# Patient Record
Sex: MALE | Race: WHITE
[De-identification: names, ages, dates, MRNs, and addresses within clinical notes are randomized per-mention and may not be internally consistent; named-entity substitution may affect disease eponyms.]

---

## 2018-04-08 NOTE — EMERGENCY ROOM VISIT NOTE
History


Report prepared by Adela:  Desiree Mcfadden


Under the Supervision of:  Dr. Aiden Berry M.D.


First contact with patient:  09:17


Chief Complaint:  ARM PAIN


Stated Complaint:  RIGHT ARM PAIN





History of Present Illness


The patient is a 17 year old male who presents to the Emergency Room with 

complaints of persistent right arm pain starting last night. He fell and landed 

on both arms last night. He reports pain mainly in his right elbow. He rates 

his discomfort as a 6-7/10 at worst. He denies any other injury. He can move 

his elbow, but cannot straighten his arm because of the pain. He has never 

injured his right arm before. He is right handed. He took an [employer] to the 

hospital. Pt denies LOC, headache, visual changes, neck pain, chest pain, 

breathing difficulties, nausea, vomiting, abdominal pain, back pain, numbness, 

weakness, open wounds, active bleeding, or other complaints.





   Source of History:  patient


   Onset:  last night


   Position:  arm (right)


   Symptom Intensity:  6-7/10


   Quality:  other (persistent)


   Timing:  other (injury, pain)


   Modifying Factors (Worsening):  movement


Note:


Pt denies any other injury.





Review of Systems


See HPI for pertinent positives and negatives.  A total of 6 systems were 

reviewed and were otherwise negative.





Past Medical & Surgical


Medical Problems:


(1) No chronic problems








Family History


No pertinent family history stated.





Social History


Smoking Status:  Current Every Day Smoker


Housing Status:  lives with roommate


Occupation Status:  [employer] student





Current/Historical Medications


No Active Prescriptions or Reported Meds





Allergies


Coded Allergies:  


     No Known Allergies (Unverified , 4/8/18)





Physical Exam


Vital Signs











  Date Time  Temp Pulse Resp B/P (MAP) Pulse Ox O2 Delivery O2 Flow Rate FiO2


 


4/8/18 10:44  75 16  97   


 


4/8/18 09:01 36.7 84 16 126/85 97 Room Air  











Physical Exam


GENERAL: Awake, alert, well-appearing, in no distress


HENT: Normocephalic, atraumatic. Oropharynx unremarkable.


EYES: Normal conjunctiva. Sclera non-icteric.


NECK: Supple. No nuchal rigidity. FROM. No masses.


RESPIRATORY: Clear to auscultation. No wheezes. No rales.  Normal respiratory 

effort.


CARDIAC: Normal rate.  Normal rhythm. No murmurs.  No rubs. Extremities warm 

and well perfused. Pulses equal.  No JVD.


GI: Soft, non-distended. No tenderness to palpation. No rebound or guarding. No 

masses.


RECTAL: Deferred.


MUSCULOSKELETAL: Tenderness over the radial head of the right arm, swelling 

over the olecranon. Medial and lateral epicondyle nontender. RUE is NVI over 

all dermatomes and myotomes. Shoulder is atraumatic and nontender. Distal 

forearm, wrist, and hand are atraumatic and nontender. Chest examination 

reveals no tenderness. The back is symmetrical on inspection without obvious 

abnormality. There is no CVA tenderness to palpation. No joint edema. 


LOWER EXTREMITIES: Calves are equal size bilaterally and non-tender. No edema. 

No discoloration. 


NEURO: Normal sensorium. No sensory or motor deficits noted. 


SKIN: No rash or jaundice noted.





Medical Decision & Procedures


ER Provider


Diagnostic Interpretation:


Radiology results as stated below per my review and radiologist interpretation: 





R ELBOW MIN 3 VIEWS ROUTINE





CLINICAL HISTORY: Elbow pain status post trauma     





COMPARISON: None.





DISCUSSION: There is a hemarthrosis. There is a radial head fracture. There is


no dislocation.    





IMPRESSION: Acute radial head fracture. Hemarthrosis.











Electronically signed by:  Sarmad Hernandez M.D.


4/8/2018 9:56 AM





Dictated Date/Time:  4/8/2018 9:55 AM





ED Course


0938: The patient was evaluated in room C12B. A complete history and physical 

exam was performed.





1024: I reevaluated the patient. Discussed results and discharge instructions: 

He verbalized understanding and agreement. The patient is ready for discharge.





Medical Decision


Triage Nursing notes reviewed and agree them.


The patient's history was concerning for traumatic injury.  





Differential diagnosis:


Etiologies such as fracture, dislocation, neurovascular compromise, compartment 

syndrome, soft tissue injury,  as well as others were entertained.  





Physical examination:


Consistent with an isolated right elbow injury.





ER treatment provided:


Patient declined analgesia


Ice


Sling


On reassessment the patient felt better.





Diagnostics interpreted by me:





Imaging studies:


Xrays as above.





The patient has isolated radial head fracture.  He is neurovascularly intact 

over all dermatomes and myotomes.  No open wounds.  He will be placed in a 

sling.  He was offered prescription pain medication but declined.  He will use 

ibuprofen and Tylenol.  He will follow-up at [facility] tomorrow.  

I gave my usual and customary discussion regarding this issue.


By the evaluation outlined above emergent etiologies such as open fracture, 

dislocation, neurovascular compromise, compartment syndrome, infections, as 

well as others were deemed relatively unlikely.  


The patient was informed about the findings as listed above.  All questions 

were answered and he was pleased with the treatment.  Return instructions were 

outlined and the patient was discharged in stable condition.





Prescription management:


Declined





Referral: 


The patient was referred to [facility] orth[facility] for follow-up care.





Medication Reconcilliation


Current Medication List:  was personally reviewed by me





Blood Pressure Screening


Patient's blood pressure:  Elevated blood pressure


Blood pressure disposition:  Elevated BP felt to be situational





Impression





 Primary Impression:  


 Radial head fracture





Scribe Attestation


The scribe's documentation has been prepared under my direction and personally 

reviewed by me in its entirety. I confirm that the note above accurately 

reflects all work, treatment, procedures, and medical decision making performed 

by me.





Departure Information


Dispostion


Home / Self-Care





Prescriptions





No Active Prescriptions or Reported Meds





Referrals


Shankar Malik MD





Forms


HOME CARE DOCUMENTATION FORM,                                                 

               IMPORTANT VISIT INFORMATION





Patient Instructions


My [facility]





Additional Instructions





ORTHOPEDIC INSTRUCTIONS:





Ibuprofen(Motrin, Advil) may be used for fever or pain.  Use 600mg every six 

hours as needed.  Take with food.  Avoid using more than 2400mg in a 24 hour 

period.  Do not use 2400mg per day for more than three consecutive days without 

physician direction.  Prolonged inappropriate use can lead to stomach upset or 

ulcers.  


(AND/OR)


Acetaminophen(Tylenol) may be used for fever or pain.  Use 1000mg every six 

hours as needed.  Avoid using more than 4000mg in a 24 hour period.  





Ice compresses for 20 minutes at a time four times daily for 2-3 days.





Use the crutches as instructed. 





Use the sling as instructed.  





Rest and elevate your injury.





Return to the ER immediately for any numbness, tingling, severe pain, extreme 

swelling in the extremity or as needed.





Call [facility], [phone], tomorrow morning between 8 and 830 to 

arrange follow up for your injury.  Tell the  that you were in the ER 

and diagnosed with a radial head fracture.

## 2018-05-04 ENCOUNTER — HOSPITAL ENCOUNTER (OUTPATIENT)
Dept: HOSPITAL 45 - C.RDSM | Age: 18
Discharge: HOME | End: 2018-05-04
Attending: ORTHOPAEDIC SURGERY
Payer: COMMERCIAL
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
## 2018-05-04 DIAGNOSIS — S42.401A: Primary | ICD-10-CM
